# Patient Record
Sex: FEMALE | Race: OTHER
[De-identification: names, ages, dates, MRNs, and addresses within clinical notes are randomized per-mention and may not be internally consistent; named-entity substitution may affect disease eponyms.]

---

## 2018-08-16 ENCOUNTER — HOSPITAL ENCOUNTER (INPATIENT)
Dept: HOSPITAL 54 - ER | Age: 63
LOS: 6 days | Discharge: HOME HEALTH | DRG: 871 | End: 2018-08-22
Attending: NURSE PRACTITIONER | Admitting: NURSE PRACTITIONER
Payer: MEDICARE

## 2018-08-16 VITALS — WEIGHT: 124.5 LBS | BODY MASS INDEX: 20.74 KG/M2 | HEIGHT: 65 IN

## 2018-08-16 VITALS — SYSTOLIC BLOOD PRESSURE: 94 MMHG | DIASTOLIC BLOOD PRESSURE: 49 MMHG

## 2018-08-16 DIAGNOSIS — D63.8: ICD-10-CM
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
## 2018-08-16 DIAGNOSIS — T14.8XXA: ICD-10-CM

## 2018-08-16 DIAGNOSIS — R13.10: ICD-10-CM

## 2018-08-16 DIAGNOSIS — E83.39: ICD-10-CM

## 2018-08-16 DIAGNOSIS — D69.6: ICD-10-CM

## 2018-08-16 DIAGNOSIS — L89.159: ICD-10-CM

## 2018-08-16 DIAGNOSIS — Z51.5: ICD-10-CM

## 2018-08-16 DIAGNOSIS — E87.6: ICD-10-CM

## 2018-08-16 DIAGNOSIS — E43: ICD-10-CM

## 2018-08-16 DIAGNOSIS — C56.9: ICD-10-CM

## 2018-08-16 DIAGNOSIS — D50.9: ICD-10-CM

## 2018-08-16 DIAGNOSIS — E83.51: ICD-10-CM

## 2018-08-16 DIAGNOSIS — Z66: ICD-10-CM

## 2018-08-16 DIAGNOSIS — E83.42: ICD-10-CM

## 2018-08-16 DIAGNOSIS — J18.9: ICD-10-CM

## 2018-08-16 DIAGNOSIS — B95.2: ICD-10-CM

## 2018-08-16 DIAGNOSIS — Z86.73: ICD-10-CM

## 2018-08-16 DIAGNOSIS — Z88.0: ICD-10-CM

## 2018-08-16 DIAGNOSIS — G93.41: ICD-10-CM

## 2018-08-16 DIAGNOSIS — D53.9: ICD-10-CM

## 2018-08-16 DIAGNOSIS — E53.8: ICD-10-CM

## 2018-08-16 DIAGNOSIS — R65.20: ICD-10-CM

## 2018-08-16 DIAGNOSIS — Z93.2: ICD-10-CM

## 2018-08-16 DIAGNOSIS — A41.9: Primary | ICD-10-CM

## 2018-08-16 DIAGNOSIS — K92.2: ICD-10-CM

## 2018-08-16 DIAGNOSIS — N39.0: ICD-10-CM

## 2018-08-16 DIAGNOSIS — J90: ICD-10-CM

## 2018-08-16 DIAGNOSIS — B96.89: ICD-10-CM
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
## 2018-08-16 DIAGNOSIS — Z79.899: ICD-10-CM

## 2018-08-16 LAB
ALBUMIN SERPL BCP-MCNC: 1.2 G/DL (ref 3.4–5)
ALP SERPL-CCNC: 148 U/L (ref 46–116)
ALT SERPL W P-5'-P-CCNC: 13 U/L (ref 12–78)
APTT PPP: 33 SEC (ref 23–34)
AST SERPL W P-5'-P-CCNC: 24 U/L (ref 15–37)
BASOPHILS # BLD AUTO: 0 /CMM (ref 0–0.2)
BASOPHILS NFR BLD AUTO: 0.1 % (ref 0–2)
BILIRUB DIRECT SERPL-MCNC: 0.1 MG/DL (ref 0–0.2)
BILIRUB SERPL-MCNC: 0.2 MG/DL (ref 0.2–1)
BUN SERPL-MCNC: 16 MG/DL (ref 7–18)
CALCIUM SERPL-MCNC: 7.4 MG/DL (ref 8.5–10.1)
CHLORIDE SERPL-SCNC: 109 MMOL/L (ref 98–107)
CO2 SERPL-SCNC: 32 MMOL/L (ref 21–32)
CREAT SERPL-MCNC: 0.4 MG/DL (ref 0.6–1.3)
EOSINOPHIL NFR BLD AUTO: 0.2 % (ref 0–6)
GLUCOSE SERPL-MCNC: 95 MG/DL (ref 74–106)
HCT VFR BLD AUTO: 26 % (ref 33–45)
HGB BLD-MCNC: 8.4 G/DL (ref 11.5–14.8)
INR PPP: 1.02 (ref 0.87–1.13)
LIPASE SERPL-CCNC: 52 U/L (ref 73–393)
LYMPHOCYTES NFR BLD AUTO: 1.3 /CMM (ref 0.8–4.8)
LYMPHOCYTES NFR BLD AUTO: 23.1 % (ref 20–44)
LYMPHOCYTES NFR BLD MANUAL: 18 % (ref 16–48)
MCH RBC QN AUTO: 34 PG (ref 26–33)
MCHC RBC AUTO-ENTMCNC: 32 G/DL (ref 31–36)
MCV RBC AUTO: 106 FL (ref 82–100)
MONOCYTES NFR BLD AUTO: 0.1 /CMM (ref 0.1–1.3)
MONOCYTES NFR BLD AUTO: 1.5 % (ref 2–12)
MONOCYTES NFR BLD MANUAL: 4 % (ref 0–11)
NEUTROPHILS # BLD AUTO: 4.1 /CMM (ref 1.8–8.9)
NEUTROPHILS NFR BLD AUTO: 75.1 % (ref 43–81)
NEUTS BAND NFR BLD MANUAL: 12 % (ref 0–5)
NEUTS SEG NFR BLD MANUAL: 66 % (ref 42–76)
PLATELET # BLD AUTO: 6 /CMM (ref 150–450)
POTASSIUM SERPL-SCNC: 2.9 MMOL/L (ref 3.5–5.1)
PROT SERPL-MCNC: 3.9 G/DL (ref 6.4–8.2)
RBC # BLD AUTO: 2.47 MIL/UL (ref 4–5.2)
RDW COEFFICIENT OF VARIATION: 22.2 (ref 11.5–15)
SODIUM SERPL-SCNC: 142 MMOL/L (ref 136–145)
WBC NRBC COR # BLD AUTO: 5.5 K/UL (ref 4.3–11)

## 2018-08-16 PROCEDURE — A6402 STERILE GAUZE <= 16 SQ IN: HCPCS

## 2018-08-16 PROCEDURE — A6403 STERILE GAUZE>16 <= 48 SQ IN: HCPCS

## 2018-08-16 PROCEDURE — A9563 P32 NA PHOSPHATE: HCPCS

## 2018-08-16 PROCEDURE — C9113 INJ PANTOPRAZOLE SODIUM, VIA: HCPCS

## 2018-08-16 PROCEDURE — 30233R1 TRANSFUSION OF NONAUTOLOGOUS PLATELETS INTO PERIPHERAL VEIN, PERCUTANEOUS APPROACH: ICD-10-PCS | Performed by: NURSE PRACTITIONER

## 2018-08-16 PROCEDURE — A4606 OXYGEN PROBE USED W OXIMETER: HCPCS

## 2018-08-16 PROCEDURE — Z7610: HCPCS

## 2018-08-16 NOTE — NUR
RN NOTE



RECEIVED PT IN NO ACUTE DISTRESS IN BED. PT IS A/O X 1-2 AND ABLE TO MAKE NEEDS KNOWN BY YES 
AND NO QUESTIONS. PT HAS A YES/NO CHART TO FACILITATE NEEDS. PT IS ON O2 VIA NC @ 2LPM AND 
TOLERATING WELL WITH O2 SAT @ 98%. PT HAS COLOSTOMY BAG THAT IS CLEAN DRY INTACT AND PATENT 
WITH BROWN WATERY STOOL. PT HAS ESTELA PORTACATH THAT IS CLEAN DRY INTACT AND PATENT WITH NS 
INFUSING FROM ER. PT HAS LEFT WRIST 20G THAT IS CLEAN DRY INTACT AND PATENT WITH POTASSIUM 
INFUSING FROM ER. PT HAS LEFT ANKLE 18G THAT IS CLEAN DRY INTACT AND PATENT WITH SALINE 
FLUSH. BED IN LOW LOCK POSITION WITH RIALS UP X 2. CALL LIGHT WITHIN REACH AND ALL SAFETY 
MEASURES ENSURED AND CARRIED OUT. WILL CONTINUE TO MONITOR.

## 2018-08-16 NOTE — NUR
PT FROM HOME COMPLAINING OF RECTAL BLEEDING SINCE LAST NIGHT. UNABLE TO ANSWER 
QUESTIONS DUE TO PREVIOUS STROKE PER DAUGHTER. PT ABLE TO ANSWER YES AND NO 
APPROPRIATELY. NO S/S OF SOB. PURPURA NOTED ON UPPER EXTREMITIES. COLOSTOMY BAG 
PRESENT ON ABDOMEN. EDEMA TO UPPER AND LOWER RT EXTREMITY. NAD. 92% ON ROOM 
AIR. O2 2LPM VIA MATT SMALLWOOD MD NOTIFIED. ALL OTHER VSS. WILL CONTINUE TO 
MONITOR

## 2018-08-17 VITALS — SYSTOLIC BLOOD PRESSURE: 92 MMHG | DIASTOLIC BLOOD PRESSURE: 47 MMHG

## 2018-08-17 VITALS — SYSTOLIC BLOOD PRESSURE: 90 MMHG | DIASTOLIC BLOOD PRESSURE: 50 MMHG

## 2018-08-17 VITALS — SYSTOLIC BLOOD PRESSURE: 76 MMHG | DIASTOLIC BLOOD PRESSURE: 41 MMHG

## 2018-08-17 VITALS — SYSTOLIC BLOOD PRESSURE: 85 MMHG | DIASTOLIC BLOOD PRESSURE: 56 MMHG

## 2018-08-17 VITALS — SYSTOLIC BLOOD PRESSURE: 90 MMHG | DIASTOLIC BLOOD PRESSURE: 52 MMHG

## 2018-08-17 VITALS — DIASTOLIC BLOOD PRESSURE: 65 MMHG | SYSTOLIC BLOOD PRESSURE: 109 MMHG

## 2018-08-17 VITALS — SYSTOLIC BLOOD PRESSURE: 94 MMHG | DIASTOLIC BLOOD PRESSURE: 49 MMHG

## 2018-08-17 VITALS — SYSTOLIC BLOOD PRESSURE: 90 MMHG | DIASTOLIC BLOOD PRESSURE: 60 MMHG

## 2018-08-17 VITALS — DIASTOLIC BLOOD PRESSURE: 57 MMHG | SYSTOLIC BLOOD PRESSURE: 90 MMHG

## 2018-08-17 VITALS — SYSTOLIC BLOOD PRESSURE: 132 MMHG | DIASTOLIC BLOOD PRESSURE: 69 MMHG

## 2018-08-17 LAB
ALBUMIN SERPL BCP-MCNC: 1.6 G/DL (ref 3.4–5)
ALP SERPL-CCNC: 139 U/L (ref 46–116)
ALT SERPL W P-5'-P-CCNC: 15 U/L (ref 12–78)
APPEARANCE UR: (no result)
APTT PPP: 26 SEC (ref 23–34)
AST SERPL W P-5'-P-CCNC: 30 U/L (ref 15–37)
BASOPHILS # BLD AUTO: 0 /CMM (ref 0–0.2)
BASOPHILS # BLD AUTO: 0 /CMM (ref 0–0.2)
BASOPHILS NFR BLD AUTO: 0 % (ref 0–2)
BASOPHILS NFR BLD AUTO: 0 % (ref 0–2)
BILIRUB SERPL-MCNC: 0.9 MG/DL (ref 0.2–1)
BILIRUB UR QL STRIP: NEGATIVE
BUN SERPL-MCNC: 12 MG/DL (ref 7–18)
BUN SERPL-MCNC: 13 MG/DL (ref 7–18)
CALCIUM SERPL-MCNC: 7.2 MG/DL (ref 8.5–10.1)
CALCIUM SERPL-MCNC: 7.4 MG/DL (ref 8.5–10.1)
CHLORIDE SERPL-SCNC: 107 MMOL/L (ref 98–107)
CHLORIDE SERPL-SCNC: 108 MMOL/L (ref 98–107)
CHOLEST SERPL-MCNC: 104 MG/DL (ref ?–200)
CO2 SERPL-SCNC: 31 MMOL/L (ref 21–32)
CO2 SERPL-SCNC: 32 MMOL/L (ref 21–32)
COLOR UR: YELLOW
CREAT SERPL-MCNC: 0.4 MG/DL (ref 0.6–1.3)
CREAT SERPL-MCNC: 0.5 MG/DL (ref 0.6–1.3)
EOSINOPHIL NFR BLD AUTO: 0 % (ref 0–6)
EOSINOPHIL NFR BLD AUTO: 0.2 % (ref 0–6)
GLUCOSE SERPL-MCNC: 116 MG/DL (ref 74–106)
GLUCOSE SERPL-MCNC: 95 MG/DL (ref 74–106)
GLUCOSE UR STRIP-MCNC: NEGATIVE MG/DL
HCT VFR BLD AUTO: 24 % (ref 33–45)
HCT VFR BLD AUTO: 24 % (ref 33–45)
HDLC SERPL-MCNC: 32 MG/DL (ref 40–60)
HGB BLD-MCNC: 7.6 G/DL (ref 11.5–14.8)
HGB BLD-MCNC: 7.6 G/DL (ref 11.5–14.8)
HGB UR QL STRIP: (no result) ERY/UL
INR PPP: 0.96 (ref 0.87–1.13)
IRON SERPL-MCNC: 55 UG/DL (ref 50–175)
KETONES UR STRIP-MCNC: NEGATIVE MG/DL
LDLC SERPL DIRECT ASSAY-MCNC: 56 MG/DL (ref 0–99)
LEUKOCYTE ESTERASE UR QL STRIP: NEGATIVE
LYMPHOCYTES NFR BLD AUTO: 0.6 /CMM (ref 0.8–4.8)
LYMPHOCYTES NFR BLD AUTO: 0.8 /CMM (ref 0.8–4.8)
LYMPHOCYTES NFR BLD AUTO: 10.4 % (ref 20–44)
LYMPHOCYTES NFR BLD AUTO: 13 % (ref 20–44)
LYMPHOCYTES NFR BLD MANUAL: 10 % (ref 16–48)
LYMPHOCYTES NFR BLD MANUAL: 4 % (ref 16–48)
MAGNESIUM SERPL-MCNC: 1.3 MG/DL (ref 1.8–2.4)
MCH RBC QN AUTO: 33 PG (ref 26–33)
MCH RBC QN AUTO: 34 PG (ref 26–33)
MCHC RBC AUTO-ENTMCNC: 32 G/DL (ref 31–36)
MCHC RBC AUTO-ENTMCNC: 32 G/DL (ref 31–36)
MCV RBC AUTO: 106 FL (ref 82–100)
MCV RBC AUTO: 107 FL (ref 82–100)
MONOCYTES NFR BLD AUTO: 0.3 /CMM (ref 0.1–1.3)
MONOCYTES NFR BLD AUTO: 0.3 /CMM (ref 0.1–1.3)
MONOCYTES NFR BLD AUTO: 4.1 % (ref 2–12)
MONOCYTES NFR BLD AUTO: 5.5 % (ref 2–12)
MONOCYTES NFR BLD MANUAL: 2 % (ref 0–11)
MONOCYTES NFR BLD MANUAL: 8 % (ref 0–11)
NEUTROPHILS # BLD AUTO: 4.7 /CMM (ref 1.8–8.9)
NEUTROPHILS # BLD AUTO: 5.2 /CMM (ref 1.8–8.9)
NEUTROPHILS NFR BLD AUTO: 82.7 % (ref 43–81)
NEUTROPHILS NFR BLD AUTO: 84.1 % (ref 43–81)
NEUTS BAND NFR BLD MANUAL: 12 % (ref 0–5)
NEUTS BAND NFR BLD MANUAL: 5 % (ref 0–5)
NEUTS SEG NFR BLD MANUAL: 76 % (ref 42–76)
NEUTS SEG NFR BLD MANUAL: 83 % (ref 42–76)
NITRITE UR QL STRIP: POSITIVE
PH UR STRIP: 6 [PH] (ref 5–8)
PHOSPHATE SERPL-MCNC: 2.5 MG/DL (ref 2.5–4.9)
PLATELET # BLD AUTO: 152 /CMM (ref 150–450)
PLATELET # BLD AUTO: 218 /CMM (ref 150–450)
POTASSIUM SERPL-SCNC: 3.4 MMOL/L (ref 3.5–5.1)
POTASSIUM SERPL-SCNC: 3.4 MMOL/L (ref 3.5–5.1)
PROT SERPL-MCNC: 4.5 G/DL (ref 6.4–8.2)
PROT UR QL STRIP: (no result) MG/DL
RBC # BLD AUTO: 2.21 MIL/UL (ref 4–5.2)
RBC # BLD AUTO: 2.28 MIL/UL (ref 4–5.2)
RBC #/AREA URNS HPF: (no result) /HPF (ref 0–2)
RDW COEFFICIENT OF VARIATION: 21.5 (ref 11.5–15)
RDW COEFFICIENT OF VARIATION: 21.9 (ref 11.5–15)
SODIUM SERPL-SCNC: 140 MMOL/L (ref 136–145)
SODIUM SERPL-SCNC: 142 MMOL/L (ref 136–145)
TIBC SERPL-MCNC: 135 UG/DL (ref 250–450)
TRIGL SERPL-MCNC: 158 MG/DL (ref 30–150)
TSH SERPL DL<=0.005 MIU/L-ACNC: 4.39 UIU/ML (ref 0.36–3.74)
UROBILINOGEN UR STRIP-MCNC: 0.2 EU/DL
WBC #/AREA URNS HPF: (no result) /HPF (ref 0–3)
WBC NRBC COR # BLD AUTO: 5.6 K/UL (ref 4.3–11)
WBC NRBC COR # BLD AUTO: 6.3 K/UL (ref 4.3–11)

## 2018-08-17 PROCEDURE — 30233R1 TRANSFUSION OF NONAUTOLOGOUS PLATELETS INTO PERIPHERAL VEIN, PERCUTANEOUS APPROACH: ICD-10-PCS | Performed by: NURSE PRACTITIONER

## 2018-08-17 RX ADMIN — Medication SCH ML: at 17:24

## 2018-08-17 RX ADMIN — MAGNESIUM SULFATE IN DEXTROSE SCH MLS/HR: 10 INJECTION, SOLUTION INTRAVENOUS at 15:57

## 2018-08-17 RX ADMIN — Medication SCH ML: at 12:13

## 2018-08-17 RX ADMIN — Medication PRN OZ: at 09:38

## 2018-08-17 RX ADMIN — SODIUM CHLORIDE SCH MG: 9 INJECTION, SOLUTION INTRAVENOUS at 09:37

## 2018-08-17 RX ADMIN — SODIUM CHLORIDE, SODIUM LACTATE, POTASSIUM CHLORIDE, AND CALCIUM CHLORIDE PRN MLS/HR: .6; .31; .03; .02 INJECTION, SOLUTION INTRAVENOUS at 10:07

## 2018-08-17 RX ADMIN — CEFEPIME HYDROCHLORIDE SCH MLS/HR: 1 INJECTION, POWDER, FOR SOLUTION INTRAMUSCULAR; INTRAVENOUS at 21:12

## 2018-08-17 RX ADMIN — DEXTROSE MONOHYDRATE SCH MLS/HR: 50 INJECTION, SOLUTION INTRAVENOUS at 10:44

## 2018-08-17 RX ADMIN — Medication PRN EACH: at 03:30

## 2018-08-17 RX ADMIN — MAGNESIUM SULFATE IN DEXTROSE SCH MLS/HR: 10 INJECTION, SOLUTION INTRAVENOUS at 12:13

## 2018-08-17 RX ADMIN — MIDODRINE HYDROCHLORIDE SCH MG: 5 TABLET ORAL at 17:23

## 2018-08-17 RX ADMIN — MAGNESIUM SULFATE IN DEXTROSE SCH MLS/HR: 10 INJECTION, SOLUTION INTRAVENOUS at 17:23

## 2018-08-17 RX ADMIN — DEXTROSE MONOHYDRATE SCH MLS/HR: 50 INJECTION, SOLUTION INTRAVENOUS at 22:00

## 2018-08-17 RX ADMIN — MAGNESIUM SULFATE IN DEXTROSE SCH MLS/HR: 10 INJECTION, SOLUTION INTRAVENOUS at 12:59

## 2018-08-17 RX ADMIN — CEFEPIME HYDROCHLORIDE SCH MLS/HR: 1 INJECTION, POWDER, FOR SOLUTION INTRAMUSCULAR; INTRAVENOUS at 09:38

## 2018-08-17 RX ADMIN — MIDODRINE HYDROCHLORIDE SCH MG: 5 TABLET ORAL at 12:12

## 2018-08-17 RX ADMIN — Medication SCH ML: at 13:11

## 2018-08-17 RX ADMIN — MIDODRINE HYDROCHLORIDE SCH MG: 5 TABLET ORAL at 09:37

## 2018-08-17 RX ADMIN — Medication PRN EACH: at 16:29

## 2018-08-17 RX ADMIN — Medication PRN EACH: at 10:07

## 2018-08-17 RX ADMIN — FLUDROCORTISONE ACETATE SCH MG: 0.1 TABLET ORAL at 09:38

## 2018-08-17 NOTE — NUR
RN NOTE



PT REMAINS IN NO ACUTE DISTRESS IN BED. PT DID NOT HAVE ANY SIGNIFICANT CHANGE IN CONDITION 
DURING SHFIT. ALL NEEDS MET, ALL ORDERS CARRIED OUT. WILL ENDORSE CARE TO AM RN FOR 
CONTINUITY OF CARE.

## 2018-08-17 NOTE — NUR
TD/RN     AM SHIFT END NOTES



ALL NEEDS MET. FAMILY JUST CLARIFIED CODE STATUS OF DNR/DNI, PM NURSE AND CHARGE NURSE 
AWARE, ALSO MADE AWARE OF CONDITIONS IN WHICH PT WILL BE TRANSFERRED TO ICU. PT ENDORSED TO 
PM NURSE TO CONTINUE CARE. CL WITHIN REACHED AND SAFETY MAINTAINED.

## 2018-08-17 NOTE — NUR
1945 SPOKE WITH PATIENT'S  KERRI RENTERIA TO WITNESS FOR RN JUDIE PATIENT'S CODE 
STATUS. PER  PATIENT IS DO NOT RESUSCITATE/DO NOT INTUBATE.  EPIC NP SANJANA MADE 
AWARE.

## 2018-08-17 NOTE — NUR
TD/RN    LOW BP 



NOTIFIED NP ANDRAE, OF PT'S BP 76/41.  ORDERS RECEIVED TO GIVE 1L NS BOLUS.  ORDER NOTED 
AND CARRIED.

-------------------------------------------------------------------------------

Addendum: 08/17/18 at 1903 by OSBALDO DEGROOT RN

-------------------------------------------------------------------------------

ADDENDUM:  ANOTHER ORDER OF 1L NS BOLUS GIVEN AS ORDERED AROUND 1500.  MONITORING BP.

## 2018-08-17 NOTE — NUR
MS1/RN     AM SHIFT INITIAL NOTES



RECEIVED PT AWAKE SITTING IN BED WITH DAUGHTER AT BEDSIDE. PT ALERT OCCASIONALLY ABLE TO 
VERBALIZED NEEDS BY NODDING OR POINTING YES OR NO WITH AID OF COMMUNICATION BOARD. NO ACUTE 
DISTRESS OR CHANGE OF CONDITION NOTED. PT DENIES ANY PAIN AT THIS TIME. ON 2L O2 VIA N/C 
SATURATING @ 95%, LUNG SOUNDS DIMINISHED. WITH ON GOING IV INFUSION OF NS @ 75CC/HR ON 
PORT-A-CATH, PATENT WITH NO S/S OF INFECTION. PT NOTED WITH PITTING EDEMA +2 ON RIGHT HAND 
AND FEET + 1-2. PT ON NPO STATUS AT THIS TIME. SCHEDULED AM MEDS TO BE GIVEN. CL WITHIN 
REACHED AND SAFETY MAINTAINED.  ON GOING MONITORING.

## 2018-08-17 NOTE — NUR
MS1/RN     SWALLOW EVALUATION



PT SEEN & EVALUATED BY SPEECH THERAPIST, PT PASSED EVALUATION WITH RECOMMENDATION OF PUREED 
DIET.  NOTED, MONITORING CONTINUED.

## 2018-08-17 NOTE — NUR
TD/RN     BP UPDATE



NOTIFIED DAVI ABEBE OF PT'S LATEST BP, 85/56, 84 WITH AUTOMATIC CUFF AND RE-CHECKED 
MANUALLY, 90/60.  PER MD HOLD TRANSFER TO ICU, TO MONITOR FOR NEXT 3 HOURS IF SBP DROP TO 
80s TO GO AHEAD AND TRANSFER TO ICU.  NOTED.

## 2018-08-17 NOTE — NUR
WOUND CARE CONSULT: PT PRESENTS WITH GENERALIZED PITTING EDEMA (2+),  INTACT DEEP TISSUE 
INJURY TO SACRUM AND STAGE 1 REDNESS TO MIDBACK, MULTIPLE AREAS OF BRUISING, PRESENT ON 
ADMISSION. PT HAS FRAGILE SKIN. PT IS INCONTINENT. ALL SKIN PROTECTION AND WOUND CARE 
RECOMMENDATIONS DISCUSSED WITH NURSING STAFF. WILL SEE PRN. PT ON ANGELA ISOFLEX LOW 
AIRLOSS BED. CURRENT HAYLEY SCORE IS 10. MD IN AGREEMENT WITH PLAN OF CARE. 

-------------------------------------------------------------------------------

Addendum: 08/17/18 at 0828 by GALINA VANEGAS WNDNU

-------------------------------------------------------------------------------

Amended: Links added.

## 2018-08-17 NOTE — NUR
ICU RN - REC'D PT. W/SISTER AT BEDSIDE. PT.HAS GOOD SUPPORT SYSTEM. PT. IS LETHARGIC & WHEN 
ASKED ANY QUESTION, PT. WILL ANSWER "OK". FAMILY MADE A "YES" OR "NO" BOARD THAT PT. WILL 
POINT TO HER ANSWER. RT. SIDED DEFICIT DUE TO PREVIOUS CVA 3 WEEKS AGO. PT'S  WAS 
PHONED EARLIER TO VERIFY CODE STATUS. CHARGE RN ABBIE & STAFF RN/ME, BOTH VERIFIED 'S 
(KERRI RENTERIA) ORDER FOR DNR/DNI STATUS. NP MARIBEL ALLAN CAME TO UNIT & PROVIDED THE 
POLST. PT.HAD BEDBATH AT START OF SHIFT. DIAPERED. ILEOSTOMY INTACT. THERE IS A STANDING 
ORDER FOR STAFF TO DO 3 CONSECTUTIVE SBP'S FOR NEXT 3 HOURS. SO FAR, SBP'S ARE IN THE HIGH 
80'S-LOW 90'S. IF PT. STAYS IN THE 80'S, SHE WILL BE TRANSFERRED TO ICU FOR LEVOPHED GTT.  
NSR. PT.KEEPS TAKING OFF HER 02/2L/NC. 2 PIV'S & A RUE [id number] PATENT TO FLUSH. 
0.9%NS + 50 MeQ'S OF KCL IS INFUSING AT 125CC/HR THRU THE PORT-A-CATH. AFEBRILE. DENIES PAIN 
AT PRESENT. POOR APPETITE. ALL PULSES PALPABLE X 4 EXT./WEAK. TRACE EDEMA TO HANDS & FEET. 
PT. HAS SEVERAL SKIN ISSUES NOTED & DOCMENTED. CONT.POC.

'S'

## 2018-08-18 VITALS — DIASTOLIC BLOOD PRESSURE: 54 MMHG | SYSTOLIC BLOOD PRESSURE: 94 MMHG

## 2018-08-18 VITALS — DIASTOLIC BLOOD PRESSURE: 59 MMHG | SYSTOLIC BLOOD PRESSURE: 95 MMHG

## 2018-08-18 VITALS — DIASTOLIC BLOOD PRESSURE: 65 MMHG | SYSTOLIC BLOOD PRESSURE: 100 MMHG

## 2018-08-18 VITALS — DIASTOLIC BLOOD PRESSURE: 60 MMHG | SYSTOLIC BLOOD PRESSURE: 90 MMHG

## 2018-08-18 VITALS — SYSTOLIC BLOOD PRESSURE: 95 MMHG | DIASTOLIC BLOOD PRESSURE: 59 MMHG

## 2018-08-18 VITALS — DIASTOLIC BLOOD PRESSURE: 69 MMHG | SYSTOLIC BLOOD PRESSURE: 102 MMHG

## 2018-08-18 LAB
BASOPHILS # BLD AUTO: 0 /CMM (ref 0–0.2)
BASOPHILS # BLD AUTO: 0 /CMM (ref 0–0.2)
BASOPHILS # BLD AUTO: 0.1 /CMM (ref 0–0.2)
BASOPHILS NFR BLD AUTO: 0 % (ref 0–2)
BASOPHILS NFR BLD AUTO: 0.2 % (ref 0–2)
BASOPHILS NFR BLD AUTO: 1.2 % (ref 0–2)
BUN SERPL-MCNC: 9 MG/DL (ref 7–18)
CALCIUM SERPL-MCNC: 7 MG/DL (ref 8.5–10.1)
CHLORIDE SERPL-SCNC: 108 MMOL/L (ref 98–107)
CO2 SERPL-SCNC: 28 MMOL/L (ref 21–32)
CREAT SERPL-MCNC: 0.4 MG/DL (ref 0.6–1.3)
EOSINOPHIL NFR BLD AUTO: 0.2 % (ref 0–6)
EOSINOPHIL NFR BLD AUTO: 0.3 % (ref 0–6)
EOSINOPHIL NFR BLD AUTO: 0.3 % (ref 0–6)
GLUCOSE SERPL-MCNC: 87 MG/DL (ref 74–106)
HCT VFR BLD AUTO: 23 % (ref 33–45)
HCT VFR BLD AUTO: 24 % (ref 33–45)
HCT VFR BLD AUTO: 24 % (ref 33–45)
HGB BLD-MCNC: 7.2 G/DL (ref 11.5–14.8)
HGB BLD-MCNC: 7.6 G/DL (ref 11.5–14.8)
HGB BLD-MCNC: 7.8 G/DL (ref 11.5–14.8)
LYMPHOCYTES NFR BLD AUTO: 0.9 /CMM (ref 0.8–4.8)
LYMPHOCYTES NFR BLD AUTO: 1.1 /CMM (ref 0.8–4.8)
LYMPHOCYTES NFR BLD AUTO: 13.4 % (ref 20–44)
LYMPHOCYTES NFR BLD AUTO: 17.6 % (ref 20–44)
LYMPHOCYTES NFR BLD AUTO: 2.1 /CMM (ref 0.8–4.8)
LYMPHOCYTES NFR BLD AUTO: 30 % (ref 20–44)
LYMPHOCYTES NFR BLD MANUAL: 13 % (ref 16–48)
LYMPHOCYTES NFR BLD MANUAL: 17 % (ref 16–48)
LYMPHOCYTES NFR BLD MANUAL: 18 % (ref 16–48)
MAGNESIUM SERPL-MCNC: 1.9 MG/DL (ref 1.8–2.4)
MCH RBC QN AUTO: 33 PG (ref 26–33)
MCH RBC QN AUTO: 34 PG (ref 26–33)
MCH RBC QN AUTO: 34 PG (ref 26–33)
MCHC RBC AUTO-ENTMCNC: 32 G/DL (ref 31–36)
MCHC RBC AUTO-ENTMCNC: 32 G/DL (ref 31–36)
MCHC RBC AUTO-ENTMCNC: 33 G/DL (ref 31–36)
MCV RBC AUTO: 106 FL (ref 82–100)
MONOCYTES NFR BLD AUTO: 0.2 /CMM (ref 0.1–1.3)
MONOCYTES NFR BLD AUTO: 0.2 /CMM (ref 0.1–1.3)
MONOCYTES NFR BLD AUTO: 0.4 /CMM (ref 0.1–1.3)
MONOCYTES NFR BLD AUTO: 2.6 % (ref 2–12)
MONOCYTES NFR BLD AUTO: 3 % (ref 2–12)
MONOCYTES NFR BLD AUTO: 5.2 % (ref 2–12)
MONOCYTES NFR BLD MANUAL: 3 % (ref 0–11)
MONOCYTES NFR BLD MANUAL: 3 % (ref 0–11)
MONOCYTES NFR BLD MANUAL: 5 % (ref 0–11)
NEUTROPHILS # BLD AUTO: 4.5 /CMM (ref 1.8–8.9)
NEUTROPHILS # BLD AUTO: 4.9 /CMM (ref 1.8–8.9)
NEUTROPHILS # BLD AUTO: 5.5 /CMM (ref 1.8–8.9)
NEUTROPHILS NFR BLD AUTO: 65.5 % (ref 43–81)
NEUTROPHILS NFR BLD AUTO: 79.6 % (ref 43–81)
NEUTROPHILS NFR BLD AUTO: 80.9 % (ref 43–81)
NEUTS BAND NFR BLD MANUAL: 4 % (ref 0–5)
NEUTS SEG NFR BLD MANUAL: 73 % (ref 42–76)
NEUTS SEG NFR BLD MANUAL: 80 % (ref 42–76)
NEUTS SEG NFR BLD MANUAL: 84 % (ref 42–76)
PHOSPHATE SERPL-MCNC: 1.6 MG/DL (ref 2.5–4.9)
PLATELET # BLD AUTO: 104 /CMM (ref 150–450)
PLATELET # BLD AUTO: 127 /CMM (ref 150–450)
PLATELET # BLD AUTO: 82 /CMM (ref 150–450)
POTASSIUM SERPL-SCNC: 3.3 MMOL/L (ref 3.5–5.1)
RBC # BLD AUTO: 2.15 MIL/UL (ref 4–5.2)
RBC # BLD AUTO: 2.22 MIL/UL (ref 4–5.2)
RBC # BLD AUTO: 2.31 MIL/UL (ref 4–5.2)
RDW COEFFICIENT OF VARIATION: 21.6 (ref 11.5–15)
RDW COEFFICIENT OF VARIATION: 21.7 (ref 11.5–15)
RDW COEFFICIENT OF VARIATION: 21.9 (ref 11.5–15)
SODIUM SERPL-SCNC: 140 MMOL/L (ref 136–145)
WBC NRBC COR # BLD AUTO: 6.2 K/UL (ref 4.3–11)
WBC NRBC COR # BLD AUTO: 6.8 K/UL (ref 4.3–11)
WBC NRBC COR # BLD AUTO: 6.9 K/UL (ref 4.3–11)

## 2018-08-18 RX ADMIN — Medication PRN EACH: at 21:34

## 2018-08-18 RX ADMIN — SODIUM CHLORIDE, SODIUM LACTATE, POTASSIUM CHLORIDE, AND CALCIUM CHLORIDE PRN MLS/HR: .6; .31; .03; .02 INJECTION, SOLUTION INTRAVENOUS at 03:00

## 2018-08-18 RX ADMIN — DEXTROSE MONOHYDRATE SCH MLS/HR: 50 INJECTION, SOLUTION INTRAVENOUS at 09:50

## 2018-08-18 RX ADMIN — Medication PRN EACH: at 13:01

## 2018-08-18 RX ADMIN — MIDODRINE HYDROCHLORIDE SCH MG: 5 TABLET ORAL at 08:30

## 2018-08-18 RX ADMIN — SODIUM CHLORIDE SCH MG: 9 INJECTION, SOLUTION INTRAVENOUS at 08:29

## 2018-08-18 RX ADMIN — SODIUM CHLORIDE, SODIUM LACTATE, POTASSIUM CHLORIDE, AND CALCIUM CHLORIDE PRN MLS/HR: .6; .31; .03; .02 INJECTION, SOLUTION INTRAVENOUS at 22:27

## 2018-08-18 RX ADMIN — DEXTROSE MONOHYDRATE SCH MLS/HR: 50 INJECTION, SOLUTION INTRAVENOUS at 11:46

## 2018-08-18 RX ADMIN — SODIUM CHLORIDE, SODIUM LACTATE, POTASSIUM CHLORIDE, AND CALCIUM CHLORIDE PRN MLS/HR: .6; .31; .03; .02 INJECTION, SOLUTION INTRAVENOUS at 14:40

## 2018-08-18 RX ADMIN — FLUDROCORTISONE ACETATE SCH MG: 0.1 TABLET ORAL at 08:29

## 2018-08-18 RX ADMIN — CEFEPIME HYDROCHLORIDE SCH MLS/HR: 1 INJECTION, POWDER, FOR SOLUTION INTRAMUSCULAR; INTRAVENOUS at 08:35

## 2018-08-18 RX ADMIN — DEXTROSE MONOHYDRATE PRN MG: 50 INJECTION, SOLUTION INTRAVENOUS at 13:01

## 2018-08-18 RX ADMIN — DEXTROSE MONOHYDRATE PRN MG: 50 INJECTION, SOLUTION INTRAVENOUS at 21:35

## 2018-08-18 RX ADMIN — Medication SCH ML: at 17:14

## 2018-08-18 RX ADMIN — Medication SCH ML: at 12:21

## 2018-08-18 RX ADMIN — MIDODRINE HYDROCHLORIDE SCH MG: 5 TABLET ORAL at 12:21

## 2018-08-18 RX ADMIN — DEXTROSE MONOHYDRATE SCH MLS/HR: 50 INJECTION, SOLUTION INTRAVENOUS at 21:35

## 2018-08-18 RX ADMIN — MIDODRINE HYDROCHLORIDE SCH MG: 5 TABLET ORAL at 17:13

## 2018-08-18 RX ADMIN — Medication SCH ML: at 08:36

## 2018-08-18 RX ADMIN — CEFEPIME HYDROCHLORIDE SCH MLS/HR: 1 INJECTION, POWDER, FOR SOLUTION INTRAMUSCULAR; INTRAVENOUS at 20:49

## 2018-08-18 NOTE — NUR
ICU RN - VERBAL REPORT GIVEN EARLY TO DAYSHIFT RN - JOHNATHAN. PT.IS CURRENTLY RESTING W/EYES 
CLOSED. PT'S DAUGHTER AT BS. MIV IS INFUSING AT 125CC/HR PER EMAR. AFEBRILE/. SBP'S ARE IN 
THE 90'S. THERE IS A SIGNIFICANT WEIGHT DIFFERENCE. NOTED. PT. HAD 5 DIAPER CHANGES. Z GUARD 
USED FREQUENTLY. CONT. POC.

## 2018-08-18 NOTE — NUR
KERRIE/LVN

RECEIVED REPORT FROM DAY NURSE. FAMILY AT BEDSIDE, ATTENTIVE TO PT'S NEEDS. SEE FLOWSHEET 
FOR ASSESSMENT AND SKIN ISSUES THAT ADDRESSED AS WELL. NO ACUTE DISTRESS SEEN AT THIS TIME, 
PT APPEARS COMFORTABLE. PT WAS TURNED AND REPOSITIONED FOR COMFORT AND CARE.

## 2018-08-18 NOTE — NUR
ICU RN - SBP'S CONT. TO BE IN THE HIGH 80'S, LOW 90'S. CHARGE RN ED IS AWARE OF PT'S SIT - 
UATION. ALSO, NP-EDDI CAME BY TO ASSESS PT. AT BEGINNING OF SHIFT. STATUS UPDATE GIVEN TO 
HER. CBC/DIFF WERE DRAWN AT MN. RESULTS PENDING. CONT. POC.

## 2018-08-18 NOTE — NUR
RN  NOTES 

RECEIVED PT ON BED, ALERT/ ANSWERS YES TO EVERY QUESTION. RESPIRATION EVEN AND UNLABORED,ON 
2L O2 N/C , NO DISTRESS NOTED , ON TELE SR , HR IN 80'S , ILEOSTOMY INTACT , NS WITH 5OMEQ 
KCL AT 125CC/HR RUNNING VIA R UPPER ARM POR-A-CATH , SITE CLEAN ,DRY AND INTACT , SUPPORTIVE 
FAMILY AT THE BEDSIDE, SR UP x3, CALL LIGHT WITHIN EASY REACH, BED LOCKED AND IN LOWEST 
POSITION ,CONTINUE TO MONITOR.

## 2018-08-18 NOTE — NUR
KERRIE/LVN

Pt appears to be resting comfortable, no signs of pain or acute distress at this time. Pt 
was turned and repositioned for comfort and care. Will continue to monitor this pt's pain.

## 2018-08-18 NOTE — NUR
KERRIE/LVN

PT COMPLAINED ABOUT PAIN TO ALL OVER THE BODY. GAVE NORCO 1 TAB FOR THIS AND ZOFRAN GIVEN BY 
RN CHARGE NURSE DUE TO PT FEELING NAUSEA. PT WAS TURNED AND REPOSITIONED FOR COMFORT AND 
CARE.

## 2018-08-18 NOTE — NUR
RN NOTES 

SUPPORTIVE FAMILY AT THE BEDSIDE , PT REMAINS THE SAME , NO SIGNIFICANT CHANGES NOTED ON 
THIS SHIFT, WILL ENDORSE TO NIGHT SHIFT NURSE FOR CONTINUITY OF CARE .

## 2018-08-19 VITALS — DIASTOLIC BLOOD PRESSURE: 52 MMHG | SYSTOLIC BLOOD PRESSURE: 87 MMHG

## 2018-08-19 VITALS — DIASTOLIC BLOOD PRESSURE: 66 MMHG | SYSTOLIC BLOOD PRESSURE: 104 MMHG

## 2018-08-19 VITALS — SYSTOLIC BLOOD PRESSURE: 94 MMHG | DIASTOLIC BLOOD PRESSURE: 61 MMHG

## 2018-08-19 VITALS — DIASTOLIC BLOOD PRESSURE: 54 MMHG | SYSTOLIC BLOOD PRESSURE: 89 MMHG

## 2018-08-19 VITALS — DIASTOLIC BLOOD PRESSURE: 60 MMHG | SYSTOLIC BLOOD PRESSURE: 104 MMHG

## 2018-08-19 VITALS — DIASTOLIC BLOOD PRESSURE: 56 MMHG | SYSTOLIC BLOOD PRESSURE: 96 MMHG

## 2018-08-19 LAB
BASOPHILS # BLD AUTO: 0 /CMM (ref 0–0.2)
BASOPHILS NFR BLD AUTO: 0.1 % (ref 0–2)
BASOPHILS NFR BLD AUTO: 0.3 % (ref 0–2)
BASOPHILS NFR BLD AUTO: 0.5 % (ref 0–2)
BUN SERPL-MCNC: 8 MG/DL (ref 7–18)
CALCIUM SERPL-MCNC: 6.8 MG/DL (ref 8.5–10.1)
CHLORIDE SERPL-SCNC: 108 MMOL/L (ref 98–107)
CO2 SERPL-SCNC: 27 MMOL/L (ref 21–32)
CREAT SERPL-MCNC: 0.4 MG/DL (ref 0.6–1.3)
EOSINOPHIL NFR BLD AUTO: 0.1 % (ref 0–6)
EOSINOPHIL NFR BLD AUTO: 0.2 % (ref 0–6)
EOSINOPHIL NFR BLD AUTO: 0.3 % (ref 0–6)
GLUCOSE SERPL-MCNC: 83 MG/DL (ref 74–106)
HCT VFR BLD AUTO: 24 % (ref 33–45)
HCT VFR BLD AUTO: 24 % (ref 33–45)
HCT VFR BLD AUTO: 25 % (ref 33–45)
HGB BLD-MCNC: 7.6 G/DL (ref 11.5–14.8)
HGB BLD-MCNC: 7.7 G/DL (ref 11.5–14.8)
HGB BLD-MCNC: 7.9 G/DL (ref 11.5–14.8)
LYMPHOCYTES NFR BLD AUTO: 0.9 /CMM (ref 0.8–4.8)
LYMPHOCYTES NFR BLD AUTO: 0.9 /CMM (ref 0.8–4.8)
LYMPHOCYTES NFR BLD AUTO: 1.1 /CMM (ref 0.8–4.8)
LYMPHOCYTES NFR BLD AUTO: 13.3 % (ref 20–44)
LYMPHOCYTES NFR BLD AUTO: 13.4 % (ref 20–44)
LYMPHOCYTES NFR BLD AUTO: 15.4 % (ref 20–44)
LYMPHOCYTES NFR BLD MANUAL: 10 % (ref 16–48)
LYMPHOCYTES NFR BLD MANUAL: 14 % (ref 16–48)
LYMPHOCYTES NFR BLD MANUAL: 9 % (ref 16–48)
MCH RBC QN AUTO: 33 PG (ref 26–33)
MCH RBC QN AUTO: 34 PG (ref 26–33)
MCH RBC QN AUTO: 34 PG (ref 26–33)
MCHC RBC AUTO-ENTMCNC: 32 G/DL (ref 31–36)
MCV RBC AUTO: 105 FL (ref 82–100)
MCV RBC AUTO: 106 FL (ref 82–100)
MCV RBC AUTO: 106 FL (ref 82–100)
MONOCYTES NFR BLD AUTO: 0.4 /CMM (ref 0.1–1.3)
MONOCYTES NFR BLD AUTO: 5.7 % (ref 2–12)
MONOCYTES NFR BLD AUTO: 5.7 % (ref 2–12)
MONOCYTES NFR BLD AUTO: 6.4 % (ref 2–12)
MONOCYTES NFR BLD MANUAL: 6 % (ref 0–11)
MONOCYTES NFR BLD MANUAL: 6 % (ref 0–11)
MONOCYTES NFR BLD MANUAL: 8 % (ref 0–11)
NEUTROPHILS # BLD AUTO: 5.3 /CMM (ref 1.8–8.9)
NEUTROPHILS # BLD AUTO: 5.4 /CMM (ref 1.8–8.9)
NEUTROPHILS # BLD AUTO: 5.4 /CMM (ref 1.8–8.9)
NEUTROPHILS NFR BLD AUTO: 77.8 % (ref 43–81)
NEUTROPHILS NFR BLD AUTO: 80.3 % (ref 43–81)
NEUTROPHILS NFR BLD AUTO: 80.5 % (ref 43–81)
NEUTS BAND NFR BLD MANUAL: 1 % (ref 0–5)
NEUTS BAND NFR BLD MANUAL: 3 % (ref 0–5)
NEUTS SEG NFR BLD MANUAL: 79 % (ref 42–76)
NEUTS SEG NFR BLD MANUAL: 80 % (ref 42–76)
NEUTS SEG NFR BLD MANUAL: 84 % (ref 42–76)
PHOSPHATE SERPL-MCNC: 1.6 MG/DL (ref 2.5–4.9)
PLATELET # BLD AUTO: 46 /CMM (ref 150–450)
PLATELET # BLD AUTO: 59 /CMM (ref 150–450)
PLATELET # BLD AUTO: 69 /CMM (ref 150–450)
POTASSIUM SERPL-SCNC: 4 MMOL/L (ref 3.5–5.1)
RBC # BLD AUTO: 2.24 MIL/UL (ref 4–5.2)
RBC # BLD AUTO: 2.28 MIL/UL (ref 4–5.2)
RBC # BLD AUTO: 2.36 MIL/UL (ref 4–5.2)
RDW COEFFICIENT OF VARIATION: 21.1 (ref 11.5–15)
RDW COEFFICIENT OF VARIATION: 21.6 (ref 11.5–15)
RDW COEFFICIENT OF VARIATION: 21.6 (ref 11.5–15)
SODIUM SERPL-SCNC: 139 MMOL/L (ref 136–145)
WBC NRBC COR # BLD AUTO: 6.7 K/UL (ref 4.3–11)
WBC NRBC COR # BLD AUTO: 6.8 K/UL (ref 4.3–11)
WBC NRBC COR # BLD AUTO: 6.8 K/UL (ref 4.3–11)

## 2018-08-19 RX ADMIN — CEFEPIME HYDROCHLORIDE SCH MLS/HR: 1 INJECTION, POWDER, FOR SOLUTION INTRAMUSCULAR; INTRAVENOUS at 20:03

## 2018-08-19 RX ADMIN — FLUDROCORTISONE ACETATE SCH MG: 0.1 TABLET ORAL at 08:31

## 2018-08-19 RX ADMIN — DEXTROSE MONOHYDRATE PRN MG: 50 INJECTION, SOLUTION INTRAVENOUS at 15:33

## 2018-08-19 RX ADMIN — CEFEPIME HYDROCHLORIDE SCH MLS/HR: 1 INJECTION, POWDER, FOR SOLUTION INTRAMUSCULAR; INTRAVENOUS at 08:30

## 2018-08-19 RX ADMIN — SODIUM CHLORIDE, SODIUM LACTATE, POTASSIUM CHLORIDE, AND CALCIUM CHLORIDE PRN MLS/HR: .6; .31; .03; .02 INJECTION, SOLUTION INTRAVENOUS at 19:54

## 2018-08-19 RX ADMIN — MIDODRINE HYDROCHLORIDE SCH MG: 5 TABLET ORAL at 13:00

## 2018-08-19 RX ADMIN — Medication SCH ML: at 13:27

## 2018-08-19 RX ADMIN — Medication SCH ML: at 08:29

## 2018-08-19 RX ADMIN — Medication PRN EACH: at 20:13

## 2018-08-19 RX ADMIN — Medication PRN EACH: at 03:11

## 2018-08-19 RX ADMIN — Medication PRN OZ: at 08:34

## 2018-08-19 RX ADMIN — MIDODRINE HYDROCHLORIDE SCH MG: 5 TABLET ORAL at 08:32

## 2018-08-19 RX ADMIN — Medication PRN EACH: at 08:48

## 2018-08-19 RX ADMIN — SODIUM CHLORIDE, SODIUM LACTATE, POTASSIUM CHLORIDE, AND CALCIUM CHLORIDE PRN MLS/HR: .6; .31; .03; .02 INJECTION, SOLUTION INTRAVENOUS at 11:26

## 2018-08-19 RX ADMIN — Medication PRN EACH: at 15:34

## 2018-08-19 RX ADMIN — Medication SCH ML: at 17:12

## 2018-08-19 RX ADMIN — DEXTROSE MONOHYDRATE SCH MLS/HR: 50 INJECTION, SOLUTION INTRAVENOUS at 21:45

## 2018-08-19 RX ADMIN — DEXTROSE MONOHYDRATE PRN MG: 50 INJECTION, SOLUTION INTRAVENOUS at 03:13

## 2018-08-19 RX ADMIN — DEXTROSE MONOHYDRATE SCH MLS/HR: 50 INJECTION, SOLUTION INTRAVENOUS at 10:10

## 2018-08-19 RX ADMIN — MIDODRINE HYDROCHLORIDE SCH MG: 5 TABLET ORAL at 17:12

## 2018-08-19 RX ADMIN — SODIUM CHLORIDE SCH MG: 9 INJECTION, SOLUTION INTRAVENOUS at 08:31

## 2018-08-19 NOTE — NUR
KERRIE/LVN

PT APPEARS TO BE RESTING COMFORTABLE WITH NO ACUTE SIGNS OF DISTRESS SEEN, NOR DOES PT 
APPEAR TO BE IN PAIN.PT WAS TURNED AND REPOSTIONED FOR COMFORT AND CARE. WILL CONTINUE TO 
MONITOR THIS PT.

## 2018-08-19 NOTE — NUR
KERRIE/LVN

PT C/O PAIN ALL OVER GAVE 1 TAB NORCO FOR THIS FLACC SCALE 10/10. TURN AND REPOSITIONED FOR 
COMFORT AND CARE

## 2018-08-19 NOTE — NUR
RN CLOSING NOTES:



No acute changes noted w/in shift. Pt tolerated room air & NC at 2lpm, no SOB. On 
telemonitor, still SR. R ankle G18, SL & ESTELA portacath, kept patent & intact, w/ no s/sx of 
infection/infiltration, NS + 50 meqs KCL x 125 cc/hr infusing well. Ileostomy kept C/D/I. 
Sister at bedside. Kept well rested. Needs attended. Bed kept low & in locked pos. Call 
light placed w/in reach. Will endorse to PM RN for RE.

## 2018-08-19 NOTE — NUR
KERRIE/LVN

RECEIVED REPORT FROM DAY NURSE. FAMILY AT BEDSIDE, THEY APPEAR TO BE ATTENTIVE TO PT'S 
NEEDS. SEE FLOWSHEET FOR ASSESSMENT AND SKIN ISSUES THAT ADDRESSED HERE AS WELL. NO ACUTE 
DISTRESS SEEN AT THIS TIME, PT APPEARS COMFORTABLE. PT WAS TURNED AND REPOSITIONED FOR 
COMFORT AND CARE.

## 2018-08-19 NOTE — NUR
RN INITIAL NOTES:



Rec'd pt awake on bed, not in any distress, A/O x3, answers simple questions. On room air, 
no SOB (removes NC). On telemonitor, SR. Has R ankle G18, SL & ESTELA portacath, C/D/I, no s/sx 
of infection/infiltration, NS + 50 meqs KCL x 125 cc/hr infusing well. Has ileostomy C/D/I, 
noted yellowish stool. Dtr at bedside. Provided comfort & safety measures. Bed kept low & in 
locked pos. Call light placed w/in reach. Will cont to monitor & attend pt needs.

## 2018-08-19 NOTE — NUR
KERRIE/LVN

PT COMPLAINED ABOUT PAIN TO ALL OVER THE BODY. GAVE NORCO 1 TAB FOR THIS AND ZOFRAN GIVEN BY 
RN CHARGE NURSE DUE TO PT FEELING NAUSEA. PT WAS TURNED AND REPOSITIONED FOR COMFORT AND 
CARE. WILL CONTINUE TO MONITOR THIS PT AND HER PAIN.

## 2018-08-20 VITALS — SYSTOLIC BLOOD PRESSURE: 116 MMHG | DIASTOLIC BLOOD PRESSURE: 67 MMHG

## 2018-08-20 VITALS — SYSTOLIC BLOOD PRESSURE: 104 MMHG | DIASTOLIC BLOOD PRESSURE: 68 MMHG

## 2018-08-20 VITALS — SYSTOLIC BLOOD PRESSURE: 98 MMHG | DIASTOLIC BLOOD PRESSURE: 68 MMHG

## 2018-08-20 VITALS — SYSTOLIC BLOOD PRESSURE: 97 MMHG | DIASTOLIC BLOOD PRESSURE: 56 MMHG

## 2018-08-20 VITALS — SYSTOLIC BLOOD PRESSURE: 105 MMHG | DIASTOLIC BLOOD PRESSURE: 61 MMHG

## 2018-08-20 VITALS — DIASTOLIC BLOOD PRESSURE: 64 MMHG | SYSTOLIC BLOOD PRESSURE: 93 MMHG

## 2018-08-20 LAB
BUN SERPL-MCNC: 8 MG/DL (ref 7–18)
CALCIUM SERPL-MCNC: 7.3 MG/DL (ref 8.5–10.1)
CHLORIDE SERPL-SCNC: 105 MMOL/L (ref 98–107)
CO2 SERPL-SCNC: 25 MMOL/L (ref 21–32)
CREAT SERPL-MCNC: 0.5 MG/DL (ref 0.6–1.3)
GLUCOSE SERPL-MCNC: 81 MG/DL (ref 74–106)
HCT VFR BLD AUTO: 26 % (ref 33–45)
HGB BLD-MCNC: 8.4 G/DL (ref 11.5–14.8)
IGA SERPL-MCNC: 182 MG/DL (ref 87–352)
IGM SERPL-MCNC: 38 MG/DL (ref 26–217)
LYMPHOCYTES NFR BLD MANUAL: 17 % (ref 16–48)
MCH RBC QN AUTO: 33 PG (ref 26–33)
MCHC RBC AUTO-ENTMCNC: 32 G/DL (ref 31–36)
MCV RBC AUTO: 106 FL (ref 82–100)
MONOCYTES NFR BLD MANUAL: 2 % (ref 0–11)
NEUTS BAND NFR BLD MANUAL: 5 % (ref 0–5)
NEUTS SEG NFR BLD MANUAL: 76 % (ref 42–76)
PHOSPHATE SERPL-MCNC: 1.8 MG/DL (ref 2.5–4.9)
PLATELET # BLD AUTO: 25 /CMM (ref 150–450)
POTASSIUM SERPL-SCNC: 4.1 MMOL/L (ref 3.5–5.1)
RBC # BLD AUTO: 2.5 MIL/UL (ref 4–5.2)
RDW COEFFICIENT OF VARIATION: 21.5 (ref 11.5–15)
SODIUM SERPL-SCNC: 139 MMOL/L (ref 136–145)
WBC NRBC COR # BLD AUTO: 7.6 K/UL (ref 4.3–11)

## 2018-08-20 RX ADMIN — MIDODRINE HYDROCHLORIDE SCH MG: 5 TABLET ORAL at 16:51

## 2018-08-20 RX ADMIN — MIDODRINE HYDROCHLORIDE SCH MG: 5 TABLET ORAL at 09:48

## 2018-08-20 RX ADMIN — FLUDROCORTISONE ACETATE SCH MG: 0.1 TABLET ORAL at 09:45

## 2018-08-20 RX ADMIN — DEXTROSE MONOHYDRATE SCH MLS/HR: 50 INJECTION, SOLUTION INTRAVENOUS at 10:00

## 2018-08-20 RX ADMIN — DEXTROSE MONOHYDRATE SCH MLS/HR: 50 INJECTION, SOLUTION INTRAVENOUS at 16:44

## 2018-08-20 RX ADMIN — Medication PRN EACH: at 14:35

## 2018-08-20 RX ADMIN — DEXTROSE MONOHYDRATE PRN MG: 50 INJECTION, SOLUTION INTRAVENOUS at 02:27

## 2018-08-20 RX ADMIN — SODIUM CHLORIDE SCH MG: 9 INJECTION, SOLUTION INTRAVENOUS at 09:45

## 2018-08-20 RX ADMIN — DEXTROSE MONOHYDRATE SCH MLS/HR: 50 INJECTION, SOLUTION INTRAVENOUS at 10:39

## 2018-08-20 RX ADMIN — Medication SCH ML: at 08:00

## 2018-08-20 RX ADMIN — CEFEPIME HYDROCHLORIDE SCH MLS/HR: 1 INJECTION, POWDER, FOR SOLUTION INTRAMUSCULAR; INTRAVENOUS at 09:45

## 2018-08-20 RX ADMIN — SODIUM CHLORIDE, SODIUM LACTATE, POTASSIUM CHLORIDE, AND CALCIUM CHLORIDE PRN MLS/HR: .6; .31; .03; .02 INJECTION, SOLUTION INTRAVENOUS at 16:44

## 2018-08-20 RX ADMIN — Medication PRN EACH: at 02:26

## 2018-08-20 RX ADMIN — FOLIC ACID SCH MG: 1 TABLET ORAL at 18:22

## 2018-08-20 RX ADMIN — SODIUM CHLORIDE, SODIUM LACTATE, POTASSIUM CHLORIDE, AND CALCIUM CHLORIDE PRN MLS/HR: .6; .31; .03; .02 INJECTION, SOLUTION INTRAVENOUS at 05:21

## 2018-08-20 RX ADMIN — MIDODRINE HYDROCHLORIDE SCH MG: 5 TABLET ORAL at 13:00

## 2018-08-20 RX ADMIN — Medication PRN EACH: at 20:26

## 2018-08-20 RX ADMIN — CEFEPIME HYDROCHLORIDE SCH MLS/HR: 1 INJECTION, POWDER, FOR SOLUTION INTRAMUSCULAR; INTRAVENOUS at 20:26

## 2018-08-20 NOTE — NUR
KERRIE RN OPENING NOTES

RECEIVED REPORT FROM KARLA RN. PATIENT A/A/O X1-2, ONLY SAYS "OK" & POINTS ON YES/NO 
CARD. BREATHING EVEN & UNLABORED, TOLERATING O2 @ 2LPM. NO S/S OF SOB OR DIFFICULTY 
BREATHING. ON TELE W/ SINUS RHYTHM, HR 93. RIGHT UPPER ARM [id number] & RIGHT ANKLE IV #18 
INTACT & PATENT W/ DRESSING CDI & IVF NS W/ 50 MEQ KCL INFUSING WELL @ 125 ML/HR. ILEOSTOMY 
BAG IN PLACE. NO SIGNS OF INFECTION NOTED AROUND SITE. NO S/S OF PAIN OR DISCOMFORT @ THIS 
TIME. SAFETY MEASURES IN PLACE W/ BED ALARM ON. FAMILY @ BEDSIDE. WILL CONTINUE TO MONITOR 
CLOSELY.

## 2018-08-20 NOTE — NUR
T/ D NURSE NOTES



ENDORSED PATIENT TO NIGHT NURSE FOR CONTINUITY OF CARE, NO ACUTE CHANGES NOTED WITHIN THE 
SHIFT. CALL LIGHT WITHIN REACH, SAFETY MAINTAINED

## 2018-08-20 NOTE — NUR
T/D NURSE NOTES



SEEN AND EXAMINED BY DR BUSTOS, UPDATED REGARDING PATIENT'S CONDITION. NO NEW ORDER AT 
THIS TIME

## 2018-08-20 NOTE — NUR
T/D RN  NOTES 

RECEIVED PT ON BED, ALERT AND ABLE TO ANSWER QUESTION BY YES AND NO. AFEBRILE AT 98.0 
RESPIRATION EVEN AND UNLABORED, ON N/C WITH 2L/MIN ,NO SHORTNESS OF BREATH NOTED , ON SINUS 
RHYTHM ON TELE, HR , NO SIGNS OF ACUTE BLEEDING NOTED AT THIS TIME, ILEOSTOMY IN PLACE 
AND INTACT ,SKIN WARM TO TOUCH. WITH NORMAL SALINE WITH 5OMEQ KCL AT 125CC/HR RUNNING VIA R 
UPPER ARM POR-A-CATH , SITE DRY AND INTACT, DAUGHTER  AT THE BEDSIDE, SIDE RAILS UP, CALL 
LIGHT WITHIN EASY REACH, BED LOCKED AND IN LOWEST POSITION ,CONTINUE TO MONITOR. 

-------------------------------------------------------------------------------

Addendum: 08/20/18 at 1041 by KARLA CLIFFORD RN

-------------------------------------------------------------------------------

T/DANYA RN NOTES



DUE VANCOMYCIN NOT GIVEN DUE TO  VANCOMYCIN TROUGH LEVEL AT 22

## 2018-08-20 NOTE — NUR
KERRIE/LVN

PT WAS GIVEN BATH, ALONG WITH ORAL CARE. PT TOLERATED THIS WELL, REMAINS ON CURRENT OXYGEN 
SETTINGS WITH SATURATION AT 95%. PT WAS TURNED AND REPOSITIONED FOR COMFORT AND CARE. WILL 
CONTINUE TO MONITOR THIS PT, NO ACUTE DISTRESS SEEN AT THIS TIME. SUNDAY PICTURES WOUND 
DOCUMENTATION DONE AND PLACED IN CHART.

## 2018-08-20 NOTE — NUR
KERRIE/LVN

PT COMPLAINED ABOUT PAIN TO ALL OVER THE BODY. GAVE NORCO 1 TAB FOR THIS AND ZOFRAN GIVEN BY 
RN NURSE DUE TO PT FEELING NAUSEA. PT WAS TURNED AND REPOSITIONED FOR COMFORT AND CARE, 
DIAPER CHANGED AS WELL.

## 2018-08-20 NOTE — NUR
TD/RN     ROUNDS - DR. BUSTOS



UPDATED PT'S CONDITION. PT SEEN & EXAMINED BY DR. BUSTOS WITH FAMILY MEMBERS AT BED SIDE. 
NO NEW ORDERS RECEIVED AT THIS TIME.  MONITORING CONTINUED.

## 2018-08-21 VITALS — SYSTOLIC BLOOD PRESSURE: 106 MMHG | DIASTOLIC BLOOD PRESSURE: 78 MMHG

## 2018-08-21 VITALS — DIASTOLIC BLOOD PRESSURE: 76 MMHG | SYSTOLIC BLOOD PRESSURE: 112 MMHG

## 2018-08-21 VITALS — SYSTOLIC BLOOD PRESSURE: 101 MMHG | DIASTOLIC BLOOD PRESSURE: 65 MMHG

## 2018-08-21 VITALS — DIASTOLIC BLOOD PRESSURE: 66 MMHG | SYSTOLIC BLOOD PRESSURE: 109 MMHG

## 2018-08-21 VITALS — DIASTOLIC BLOOD PRESSURE: 80 MMHG | SYSTOLIC BLOOD PRESSURE: 126 MMHG

## 2018-08-21 VITALS — SYSTOLIC BLOOD PRESSURE: 101 MMHG | DIASTOLIC BLOOD PRESSURE: 64 MMHG

## 2018-08-21 LAB
ALBUMIN SERPL ELPH-MCNC: 1.6 G/DL (ref 2.9–4.4)
ALBUMIN/GLOB SERPL: 0.7 {RATIO} (ref 0.7–1.7)
ALPHA1 GLOB SERPL ELPH-MCNC: 0.4 G/DL (ref 0–0.4)
ALPHA2 GLOB SERPL ELPH-MCNC: 0.7 G/DL (ref 0.4–1)
B-GLOBULIN SERPL ELPH-MCNC: 0.8 G/DL (ref 0.7–1.3)
BASOPHILS # BLD AUTO: 0 /CMM (ref 0–0.2)
BASOPHILS # BLD AUTO: 0 /CMM (ref 0–0.2)
BASOPHILS NFR BLD AUTO: 0.1 % (ref 0–2)
BASOPHILS NFR BLD AUTO: 0.1 % (ref 0–2)
BUN SERPL-MCNC: 7 MG/DL (ref 7–18)
CALCIUM SERPL-MCNC: 7.3 MG/DL (ref 8.5–10.1)
CANCER AG125 SERPL-ACNC: 1421 U/ML (ref 0–38.1)
CHLORIDE SERPL-SCNC: 106 MMOL/L (ref 98–107)
CO2 SERPL-SCNC: 24 MMOL/L (ref 21–32)
CREAT SERPL-MCNC: 0.5 MG/DL (ref 0.6–1.3)
EOSINOPHIL NFR BLD AUTO: 0.1 % (ref 0–6)
EOSINOPHIL NFR BLD AUTO: 0.2 % (ref 0–6)
GAMMA GLOB SERPL ELPH-MCNC: 0.4 G/DL (ref 0.4–1.8)
GLOBULIN SER CALC-MCNC: 2.3 G/DL (ref 2.2–3.9)
GLUCOSE SERPL-MCNC: 80 MG/DL (ref 74–106)
HCT VFR BLD AUTO: 25 % (ref 33–45)
HCT VFR BLD AUTO: 26 % (ref 33–45)
HGB BLD-MCNC: 8.2 G/DL (ref 11.5–14.8)
HGB BLD-MCNC: 8.2 G/DL (ref 11.5–14.8)
LYMPHOCYTES NFR BLD AUTO: 0.9 /CMM (ref 0.8–4.8)
LYMPHOCYTES NFR BLD AUTO: 13.7 % (ref 20–44)
LYMPHOCYTES NFR BLD AUTO: 2.4 /CMM (ref 0.8–4.8)
LYMPHOCYTES NFR BLD AUTO: 32.7 % (ref 20–44)
LYMPHOCYTES NFR BLD MANUAL: 10 % (ref 16–48)
LYMPHOCYTES NFR BLD MANUAL: 6 % (ref 16–48)
MAGNESIUM SERPL-MCNC: 1.1 MG/DL (ref 1.8–2.4)
MCH RBC QN AUTO: 33 PG (ref 26–33)
MCH RBC QN AUTO: 34 PG (ref 26–33)
MCHC RBC AUTO-ENTMCNC: 31 G/DL (ref 31–36)
MCHC RBC AUTO-ENTMCNC: 32 G/DL (ref 31–36)
MCV RBC AUTO: 106 FL (ref 82–100)
MCV RBC AUTO: 106 FL (ref 82–100)
MONOCYTES NFR BLD AUTO: 0.4 /CMM (ref 0.1–1.3)
MONOCYTES NFR BLD AUTO: 0.5 /CMM (ref 0.1–1.3)
MONOCYTES NFR BLD AUTO: 5.3 % (ref 2–12)
MONOCYTES NFR BLD AUTO: 7.1 % (ref 2–12)
MONOCYTES NFR BLD MANUAL: 5 % (ref 0–11)
MONOCYTES NFR BLD MANUAL: 6 % (ref 0–11)
NEUTROPHILS # BLD AUTO: 4.5 /CMM (ref 1.8–8.9)
NEUTROPHILS # BLD AUTO: 5.1 /CMM (ref 1.8–8.9)
NEUTROPHILS NFR BLD AUTO: 61.7 % (ref 43–81)
NEUTROPHILS NFR BLD AUTO: 79 % (ref 43–81)
NEUTS BAND NFR BLD MANUAL: 4 % (ref 0–5)
NEUTS SEG NFR BLD MANUAL: 84 % (ref 42–76)
NEUTS SEG NFR BLD MANUAL: 85 % (ref 42–76)
PHOSPHATE SERPL-MCNC: 1.9 MG/DL (ref 2.5–4.9)
PLATELET # BLD AUTO: 12 /CMM (ref 150–450)
PLATELET # BLD AUTO: 13 /CMM (ref 150–450)
POTASSIUM SERPL-SCNC: 4.4 MMOL/L (ref 3.5–5.1)
RBC # BLD AUTO: 2.39 MIL/UL (ref 4–5.2)
RBC # BLD AUTO: 2.49 MIL/UL (ref 4–5.2)
RDW COEFFICIENT OF VARIATION: 21.2 (ref 11.5–15)
RDW COEFFICIENT OF VARIATION: 21.6 (ref 11.5–15)
SODIUM SERPL-SCNC: 135 MMOL/L (ref 136–145)
WBC NRBC COR # BLD AUTO: 6.4 K/UL (ref 4.3–11)
WBC NRBC COR # BLD AUTO: 7.2 K/UL (ref 4.3–11)

## 2018-08-21 RX ADMIN — MAGNESIUM SULFATE IN DEXTROSE SCH MLS/HR: 10 INJECTION, SOLUTION INTRAVENOUS at 14:36

## 2018-08-21 RX ADMIN — MAGNESIUM SULFATE IN DEXTROSE SCH MLS/HR: 10 INJECTION, SOLUTION INTRAVENOUS at 13:42

## 2018-08-21 RX ADMIN — MAGNESIUM SULFATE IN DEXTROSE SCH MLS/HR: 10 INJECTION, SOLUTION INTRAVENOUS at 10:46

## 2018-08-21 RX ADMIN — FOLIC ACID SCH MG: 1 TABLET ORAL at 08:21

## 2018-08-21 RX ADMIN — SODIUM CHLORIDE SCH MG: 9 INJECTION, SOLUTION INTRAVENOUS at 08:20

## 2018-08-21 RX ADMIN — MIDODRINE HYDROCHLORIDE SCH MG: 5 TABLET ORAL at 12:31

## 2018-08-21 RX ADMIN — Medication PRN EACH: at 14:07

## 2018-08-21 RX ADMIN — DEXTROSE MONOHYDRATE PRN MG: 50 INJECTION, SOLUTION INTRAVENOUS at 14:07

## 2018-08-21 RX ADMIN — Medication PRN EACH: at 02:07

## 2018-08-21 RX ADMIN — DEXTROSE MONOHYDRATE PRN MG: 50 INJECTION, SOLUTION INTRAVENOUS at 08:34

## 2018-08-21 RX ADMIN — DEXTROSE MONOHYDRATE SCH MLS/HR: 50 INJECTION, SOLUTION INTRAVENOUS at 11:48

## 2018-08-21 RX ADMIN — DEXTROSE MONOHYDRATE PRN MG: 50 INJECTION, SOLUTION INTRAVENOUS at 02:13

## 2018-08-21 RX ADMIN — MIDODRINE HYDROCHLORIDE SCH MG: 5 TABLET ORAL at 16:15

## 2018-08-21 RX ADMIN — MIDODRINE HYDROCHLORIDE SCH MG: 5 TABLET ORAL at 08:21

## 2018-08-21 RX ADMIN — FLUDROCORTISONE ACETATE SCH MG: 0.1 TABLET ORAL at 08:21

## 2018-08-21 RX ADMIN — MAGNESIUM SULFATE IN DEXTROSE SCH MLS/HR: 10 INJECTION, SOLUTION INTRAVENOUS at 12:51

## 2018-08-21 RX ADMIN — SODIUM CHLORIDE SCH MLS/HR: 9 INJECTION, SOLUTION INTRAVENOUS at 14:01

## 2018-08-21 RX ADMIN — Medication PRN EACH: at 08:20

## 2018-08-21 RX ADMIN — CEFEPIME HYDROCHLORIDE SCH MLS/HR: 1 INJECTION, POWDER, FOR SOLUTION INTRAMUSCULAR; INTRAVENOUS at 08:21

## 2018-08-21 RX ADMIN — SODIUM CHLORIDE, SODIUM LACTATE, POTASSIUM CHLORIDE, AND CALCIUM CHLORIDE PRN MLS/HR: .6; .31; .03; .02 INJECTION, SOLUTION INTRAVENOUS at 12:31

## 2018-08-21 RX ADMIN — SODIUM CHLORIDE SCH MLS/HR: 9 INJECTION, SOLUTION INTRAVENOUS at 11:21

## 2018-08-21 NOTE — NUR
RN NOTES

RECEIVED PT ON BED , ALERT/  ONLY SAYS "OK" & POINTS ON YES/NO CARD. BREATHING EVEN & 
UNLABORED,ON RA , NO SOB NOTED ON TELE ST, HR 'S AT THIS TIME , NO DISTRESS NOTED, 
ILEOSTOMY BAG IN PLACE, SITE CDI, RIGHT UPPER ARM [id number] & RIGHT ANKLE IV #18 SITES 
CLEAN ,  INTACT & PATENT WITH IVF NS WITH 50 MEQ KCL INFUSING WELL @ 125 ML/HR. SR UP x2, 
CALL LIGHT WITHIN EASY REACH, BED LOCKED AND IN LOWEST POSITION ,  SAFETY MEASURES IN PLACE 
, SUPPORTIVE FAMILY AT THE BEDSIDE, CONTINUE TO MONITOR .

## 2018-08-21 NOTE — NUR
RN NOTES 

PT STABLE , SUPPORTIVE FAMILY AT THE BEDSIDE, ORDER RECEIVED FROM DR. MELENDEZ TO TRANSFUSE ONE 
UNIT OF PLATLES . SR UP x3, CALL LIGHT WITHIN EASY REACH, BED LOCKED AND AND IN LOWEST 
POSITION , WILL ENDOSE TO NIGHT SHIFT NURSE FOR CONTINUITY OF CARE .

## 2018-08-21 NOTE — NUR
RN NOTES 

DR BUSTOS AND DR MELENDEZ NOTIFED REGARDING PLT=12, NO ACTIVE BLEEDING NOTED, CONTINUE TO 
MONITOR

## 2018-08-21 NOTE — NUR
RN NOTE

CALLED BLOOD BANK, PER LORAINE TECH PLATELETS ARE NOT READY YET, WILL BE READY TOWARDS THE 
AM, CHARGE NURSE IS AWARE

## 2018-08-22 VITALS — DIASTOLIC BLOOD PRESSURE: 53 MMHG | SYSTOLIC BLOOD PRESSURE: 101 MMHG

## 2018-08-22 VITALS — DIASTOLIC BLOOD PRESSURE: 73 MMHG | SYSTOLIC BLOOD PRESSURE: 113 MMHG

## 2018-08-22 VITALS — SYSTOLIC BLOOD PRESSURE: 117 MMHG | DIASTOLIC BLOOD PRESSURE: 83 MMHG

## 2018-08-22 VITALS — DIASTOLIC BLOOD PRESSURE: 76 MMHG | SYSTOLIC BLOOD PRESSURE: 113 MMHG

## 2018-08-22 VITALS — DIASTOLIC BLOOD PRESSURE: 66 MMHG | SYSTOLIC BLOOD PRESSURE: 104 MMHG

## 2018-08-22 VITALS — SYSTOLIC BLOOD PRESSURE: 108 MMHG | DIASTOLIC BLOOD PRESSURE: 78 MMHG

## 2018-08-22 VITALS — SYSTOLIC BLOOD PRESSURE: 107 MMHG | DIASTOLIC BLOOD PRESSURE: 63 MMHG

## 2018-08-22 LAB
BASOPHILS # BLD AUTO: 0 /CMM (ref 0–0.2)
BASOPHILS NFR BLD AUTO: 0.1 % (ref 0–2)
BUN SERPL-MCNC: 7 MG/DL (ref 7–18)
CALCIUM SERPL-MCNC: 7.5 MG/DL (ref 8.5–10.1)
CHLORIDE SERPL-SCNC: 104 MMOL/L (ref 98–107)
CO2 SERPL-SCNC: 24 MMOL/L (ref 21–32)
CREAT SERPL-MCNC: 0.5 MG/DL (ref 0.6–1.3)
EOSINOPHIL NFR BLD AUTO: 0.4 % (ref 0–6)
GLUCOSE SERPL-MCNC: 94 MG/DL (ref 74–106)
HCT VFR BLD AUTO: 22 % (ref 33–45)
HGB BLD-MCNC: 7 G/DL (ref 11.5–14.8)
LYMPHOCYTES NFR BLD AUTO: 1.3 /CMM (ref 0.8–4.8)
LYMPHOCYTES NFR BLD AUTO: 21.2 % (ref 20–44)
LYMPHOCYTES NFR BLD MANUAL: 12 % (ref 16–48)
MAGNESIUM SERPL-MCNC: 1.8 MG/DL (ref 1.8–2.4)
MCH RBC QN AUTO: 34 PG (ref 26–33)
MCHC RBC AUTO-ENTMCNC: 32 G/DL (ref 31–36)
MCV RBC AUTO: 106 FL (ref 82–100)
MONOCYTES NFR BLD AUTO: 0.5 /CMM (ref 0.1–1.3)
MONOCYTES NFR BLD AUTO: 7.6 % (ref 2–12)
MONOCYTES NFR BLD MANUAL: 8 % (ref 0–11)
NEUTROPHILS # BLD AUTO: 4.2 /CMM (ref 1.8–8.9)
NEUTROPHILS NFR BLD AUTO: 70.7 % (ref 43–81)
NEUTS BAND NFR BLD MANUAL: 4 % (ref 0–5)
NEUTS SEG NFR BLD MANUAL: 76 % (ref 42–76)
PHOSPHATE SERPL-MCNC: 2.2 MG/DL (ref 2.5–4.9)
PLATELET # BLD AUTO: 104 /CMM (ref 150–450)
POTASSIUM SERPL-SCNC: 3.4 MMOL/L (ref 3.5–5.1)
RBC # BLD AUTO: 2.06 MIL/UL (ref 4–5.2)
RDW COEFFICIENT OF VARIATION: 21.5 (ref 11.5–15)
SODIUM SERPL-SCNC: 136 MMOL/L (ref 136–145)
WBC NRBC COR # BLD AUTO: 5.9 K/UL (ref 4.3–11)

## 2018-08-22 RX ADMIN — SODIUM CHLORIDE, SODIUM LACTATE, POTASSIUM CHLORIDE, AND CALCIUM CHLORIDE PRN MLS/HR: .6; .31; .03; .02 INJECTION, SOLUTION INTRAVENOUS at 10:16

## 2018-08-22 RX ADMIN — FLUDROCORTISONE ACETATE SCH MG: 0.1 TABLET ORAL at 08:33

## 2018-08-22 RX ADMIN — MIDODRINE HYDROCHLORIDE SCH MG: 5 TABLET ORAL at 08:34

## 2018-08-22 RX ADMIN — DEXTROSE MONOHYDRATE SCH MLS/HR: 50 INJECTION, SOLUTION INTRAVENOUS at 04:54

## 2018-08-22 RX ADMIN — Medication PRN EACH: at 08:33

## 2018-08-22 RX ADMIN — MIDODRINE HYDROCHLORIDE SCH MG: 5 TABLET ORAL at 12:17

## 2018-08-22 RX ADMIN — FOLIC ACID SCH MG: 1 TABLET ORAL at 08:33

## 2018-08-22 RX ADMIN — DEXTROSE MONOHYDRATE PRN MG: 50 INJECTION, SOLUTION INTRAVENOUS at 12:20

## 2018-08-22 RX ADMIN — SODIUM CHLORIDE SCH MG: 9 INJECTION, SOLUTION INTRAVENOUS at 08:34

## 2018-08-22 RX ADMIN — SODIUM CHLORIDE, SODIUM LACTATE, POTASSIUM CHLORIDE, AND CALCIUM CHLORIDE PRN MLS/HR: .6; .31; .03; .02 INJECTION, SOLUTION INTRAVENOUS at 00:15

## 2018-08-22 RX ADMIN — MIDODRINE HYDROCHLORIDE SCH MG: 5 TABLET ORAL at 13:00

## 2018-08-22 NOTE — NUR
RN NOTE

PATIENT RESTED WELL AT NIGHT, NO S/S OF PAIN OR DISCOMFORT NOTED, S/P 1 UNIT OF PLATELET 
TRANSFUSION, TOLERATED WELL, NO S/S OF REACTION WAS NOTED, NO DISTRESS NOTED, ALL SAFETY 
MEASURES TAKEN, BLEEDING PRECAUTIONS TAKEN, WILL ENDORSE TO AM SHIFT TO CONTINUE CARE

## 2018-08-22 NOTE — NUR
TELE RN OPENING NOTE



RECEIVED PATIENT IN STABLE CONDITION THIS MORNING, SINUS RHYTHM ON CARDIAC MONITOR. A&OX1-2, 
DAUGHTER AT BEDSIDE. OXYGEN ON VIA NASAL CANNULA 2LPM. HEAD OF BED ELEVATED. RIGHT UPPER ARM 
PORTACATH INTACT INFUSING POTASSIUM 30MEQ IN NS AT 125ML/HR. BED LOW AND LOCKED, CALL LIGHT 
WITHIN REACH, WILL CONTINUE TO MONITOR.

## 2018-08-22 NOTE — NUR
RN MS ENDING NOTES



PATIENT DC PAPERWORK COMPLETED AND PLACED IN CHART, EXIT CARE PROVIDED, PATIENT LEAVING TO 
HOME WITH HOSPICE CARE,  AWARE AT BEDSIDE, RT ANKLE IV REMOVED NO INFILTRATION NOTED, 
ALL DUE MEDS GIVEN, VS STABLE WITH ENDING /53, REPORT PROVIDED TO AMBULANCE, ALL NEEDS 
MET, BED BATH PROVIDED, WOUND PICTURES TAKEN AND PLACED IN CHART, TWO OF THE WOUND PICTURES 
HAD LABELS CUT OFF BUT PLACED ADDENDUM INSIDE WOUND PICTURES, ID BAND REMOVED, PORTACATH 
STILL INTACT, OSTOMY BAG REPLACED, BELONGINGS LIST SIGNED, TEACHING DONE, WILL CONTINUE CARE 
AT HOME.